# Patient Record
Sex: MALE | Race: WHITE | NOT HISPANIC OR LATINO | Employment: STUDENT | URBAN - METROPOLITAN AREA
[De-identification: names, ages, dates, MRNs, and addresses within clinical notes are randomized per-mention and may not be internally consistent; named-entity substitution may affect disease eponyms.]

---
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
## 2023-06-15 ENCOUNTER — TELEPHONE (OUTPATIENT)
Dept: INTERNAL MEDICINE | Facility: CLINIC | Age: 18
End: 2023-06-15

## 2023-06-15 NOTE — TELEPHONE ENCOUNTER
Caller: Nury Rowe    Relationship to patient: Mother    Best call back number: [phone]    Chief complaint: ESTABLISHING CARE, GENERAL CHECK-UP    Type of visit: NEW PATIENT WITH  DR. OLSEN    Requested date: ASAP    If rescheduling, when is the original appointment: N/A - THE HUB EXPLAINED THAT DR. OLSEN IS NOT CURRENTLY ACCEPTING NEW PATIENTS. HOWEVER, THE PATIENT'S MOTHER SEES DR. OLSEN AND WOULD LIKE FOR HER SON TO AS WELL. THE PATIENT NEEDS TO BE SEEN THIS SUMMER BEFORE HE [place]ENDS [place].

## 2023-07-10 PROBLEM — F90.9 ADHD (ATTENTION DEFICIT HYPERACTIVITY DISORDER): Status: ACTIVE | Noted: 2023-07-10

## 2024-07-12 ENCOUNTER — OFFICE VISIT (OUTPATIENT)
Dept: INTERNAL MEDICINE | Facility: CLINIC | Age: 19
End: 2024-07-12
Payer: COMMERCIAL

## 2024-07-12 VITALS
TEMPERATURE: 97.8 F | DIASTOLIC BLOOD PRESSURE: 70 MMHG | HEIGHT: 74 IN | SYSTOLIC BLOOD PRESSURE: 122 MMHG | WEIGHT: 315 LBS | HEART RATE: 84 BPM | OXYGEN SATURATION: 99 % | BODY MASS INDEX: 40.43 KG/M2

## 2024-07-12 DIAGNOSIS — Z00.00 PHYSICAL EXAM, ANNUAL: Primary | ICD-10-CM

## 2024-07-12 PROCEDURE — 99395 PREV VISIT EST AGE 18-39: CPT | Performed by: FAMILY MEDICINE

## 2024-07-12 NOTE — PROGRESS NOTES
Subjective   Bernard Rowe is a 19 y.o. male.   Chief Complaint   Patient presents with    Annual Exam       History of Present Illness     He is here for complete physical exam.      CPE-he has no complaints.    He just came back from study abroad in [place].  He feels great.    There is no change in medical or surgical history from last year.  No ER visits or hospitalizations.  Father was diagnosed with prostate cancer.  He is 52.  He takes no prescription medications.  No over-the-counter medications.  He is not allergic to any medications.  He does not use tobacco products, he does not drink alcohol.  No drugs.  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  He will be graduating in 2025.  He eats a diverse and healthy diet.  He likes to cook.  He eats fruits and vegetables every day.     Dental appointments every 6 months.  Last eye exam was within a year.  He wears glasses.     He reports that he is up-to-date with vaccinations.  No records available today.    Review of Systems   Constitutional:  Negative for chills and fever.   Respiratory:  Negative for shortness of breath.    Cardiovascular:  Negative for chest pain and palpitations.   Gastrointestinal:  Negative for blood in stool.   Genitourinary:  Negative for hematuria.   Neurological:  Negative for light-headedness.   Psychiatric/Behavioral: Negative.  Negative for suicidal ideas.          Objective   Wt Readings from Last 3 Encounters:   07/12/24 (!) 146 kg (322 lb 3.2 oz)   07/10/23 (!) 141 kg (310 lb)   10/10/18 111 kg (245 lb) (>99%, Z= 2.95)*     * Growth percentiles are based on CDC (Boys, 2-20 Years) data.      Vitals:    07/12/24 1425   BP: 122/70   Pulse: 84   Temp: 97.8 °F (36.6 °C)   SpO2: 99%     Temp Readings from Last 3 Encounters:   07/12/24 97.8 °F (36.6 °C)   07/10/23 98.2 °F (36.8 °C)   10/10/18 99.3 °F (37.4 °C) (Oral)     BP Readings from Last 3 Encounters:   07/12/24 122/70   07/10/23 128/80   10/10/18 (!) 132/84 (92%, Z =  1.41 /  94%, Z = 1.55)*     *BP percentiles are based on the 2017 AAP Clinical Practice Guideline for boys     Pulse Readings from Last 3 Encounters:   07/12/24 84   07/10/23 83   10/10/18 76     Body mass index is 41.35 kg/m².    Physical Exam  Vitals reviewed.   Constitutional:       General: He is not in acute distress.     Appearance: He is well-developed. He is not diaphoretic.   HENT:      Head: Normocephalic and atraumatic. Hair is normal.      Right Ear: Hearing, tympanic membrane, ear canal and external ear normal.      Left Ear: Hearing, tympanic membrane, ear canal and external ear normal.      Nose: No nasal deformity.      Mouth/Throat:      Mouth: No oral lesions.      Pharynx: Uvula midline. No uvula swelling.   Eyes:      General: Lids are normal. No scleral icterus.        Right eye: No discharge.         Left eye: No discharge.      Extraocular Movements:      Right eye: Normal extraocular motion and no nystagmus.      Left eye: Normal extraocular motion and no nystagmus.      Conjunctiva/sclera: Conjunctivae normal.      Pupils: Pupils are equal, round, and reactive to light.   Neck:      Thyroid: No thyromegaly.      Vascular: No JVD.   Cardiovascular:      Rate and Rhythm: Normal rate and regular rhythm.      Pulses: Normal pulses.      Heart sounds: Normal heart sounds. No murmur heard.     No gallop.   Pulmonary:      Effort: Pulmonary effort is normal. No respiratory distress.      Breath sounds: Normal breath sounds. No wheezing or rales.   Chest:      Chest wall: No tenderness.   Abdominal:      General: Bowel sounds are normal. There is no distension.      Palpations: Abdomen is soft. There is no mass.      Tenderness: There is no abdominal tenderness. There is no guarding.      Hernia: No hernia is present. There is no hernia in the left inguinal area or right inguinal area.   Genitourinary:     Testes: Normal.         Right: Mass, tenderness or swelling not present.         Left: Mass,  tenderness or swelling not present.   Musculoskeletal:         General: No tenderness or deformity. Normal range of motion.      Cervical back: Normal range of motion.   Lymphadenopathy:      Cervical: No cervical adenopathy.      Upper Body:      Right upper body: No supraclavicular adenopathy.      Left upper body: No supraclavicular adenopathy.      Lower Body: No right inguinal adenopathy. No left inguinal adenopathy.   Skin:     General: Skin is warm and dry.      Findings: No rash.   Neurological:      Mental Status: He is alert and oriented to person, place, and time.      Cranial Nerves: No cranial nerve deficit.      Motor: No abnormal muscle tone.      Coordination: Coordination normal.      Deep Tendon Reflexes: Reflexes are normal and symmetric. Reflexes normal.   Psychiatric:         Behavior: Behavior normal.         Thought Content: Thought content normal.         Judgment: Judgment normal.         Assessment & Plan   Diagnoses and all orders for this visit:    1. Physical exam, annual (Primary)        CPE-preventive counseling provided.  Obesity increases risk for developing diabetes and heart disease.  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  He would benefit from weight loss.  He reports that he is up-to-date with vaccinations.  He will bring vaccination list to our office or mail it to us.  Sun protection discussed and recommended.  Continue regular dental appointments every 6 months.          Class 3 Severe Obesity (BMI >=40). Obesity-related health conditions include the following: none. Obesity is unchanged. BMI is is above average; BMI management plan is completed. We discussed portion control and increasing exercise.

## 2025-06-30 ENCOUNTER — TELEPHONE (OUTPATIENT)
Dept: INTERNAL MEDICINE | Facility: CLINIC | Age: 20
End: 2025-06-30

## 2025-06-30 ENCOUNTER — TELEPHONE (OUTPATIENT)
Dept: INTERNAL MEDICINE | Facility: CLINIC | Age: 20
End: 2025-06-30
Payer: COMMERCIAL

## 2025-06-30 NOTE — TELEPHONE ENCOUNTER
Caller: Bernard Rowe    Relationship to patient: Self    Best call back number:[phone]     Chief complaint: PHYSICAL    Type of visit: PHYSICAL    Requested date: SOME TIME IN JULY    If rescheduling, when is the original appointment: NOTHING UNTIL DECEMBER THAT THE HUB CAN SEE    Additional notes:PATIENT WOULD LIKE TO SEE

## 2025-07-17 ENCOUNTER — OFFICE VISIT (OUTPATIENT)
Dept: INTERNAL MEDICINE | Facility: CLINIC | Age: 20
End: 2025-07-17
Payer: COMMERCIAL

## 2025-07-17 VITALS
RESPIRATION RATE: 17 BRPM | HEIGHT: 74 IN | OXYGEN SATURATION: 97 % | SYSTOLIC BLOOD PRESSURE: 120 MMHG | DIASTOLIC BLOOD PRESSURE: 78 MMHG | BODY MASS INDEX: 40.43 KG/M2 | TEMPERATURE: 97.2 F | WEIGHT: 315 LBS | HEART RATE: 85 BPM

## 2025-07-17 DIAGNOSIS — Z00.00 PHYSICAL EXAM, ANNUAL: Primary | ICD-10-CM

## 2025-07-17 PROCEDURE — 99395 PREV VISIT EST AGE 18-39: CPT | Performed by: FAMILY MEDICINE

## 2025-07-17 NOTE — PROGRESS NOTES
Subjective   Bernard Rowe is a 20 y.o. male.   Chief Complaint   Patient presents with    Annual Exam       History of Present Illness     He is here for complete physical exam.       CPE-he has no complaints.     He just graduated from [employer].  He will be moving to [place] to study for [employer] and play football.  [employer] -10 months program.     There is no change in medical or surgical history from last year.    No ER visits or hospitalizations.  He had minor sprains playing football, but no serious injuries, no concussions.  Sister was diagnosed with seizures.    He takes no prescription medications.  No over-the-counter medications.  He is not allergic to any medications.  He does not use tobacco products, he drinks few drinks a year.  No drugs.  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  He eats a diverse and healthy diet.  He follows with nutritionist daily.     Dental appointments every 6 months.  Last eye exam was within a year.  He wears glasses.  Stable vision.     He reports that he is up-to-date with vaccinations.  No records available today.    Review of Systems   Constitutional:  Negative for fever.   Respiratory:  Negative for shortness of breath and wheezing.    Cardiovascular:  Negative for chest pain and palpitations.   Gastrointestinal:  Negative for blood in stool.   Genitourinary:  Negative for hematuria.   Neurological:  Negative for light-headedness.   Psychiatric/Behavioral:  Negative for suicidal ideas.          Objective   Wt Readings from Last 3 Encounters:   07/17/25 (!) 145 kg (320 lb)   06/21/25 (!) 146 kg (322 lb)   07/12/24 (!) 146 kg (322 lb 3.2 oz)      Vitals:    07/17/25 1416   BP: 120/78   Pulse: 85   Resp: 17   Temp: 97.2 °F (36.2 °C)   SpO2: 97%     Temp Readings from Last 3 Encounters:   07/17/25 97.2 °F (36.2 °C) (Oral)   06/21/25 98.3 °F (36.8 °C) (Oral)   07/12/24 97.8 °F (36.6 °C)     BP Readings from Last 3 Encounters:   07/17/25 120/78    06/21/25 157/99   07/12/24 122/70     Pulse Readings from Last 3 Encounters:   07/17/25 85   06/21/25 88   07/12/24 84     Body mass index is 41.07 kg/m².    Physical Exam  Vitals reviewed.   Constitutional:       General: He is not in acute distress.     Appearance: He is well-developed. He is not diaphoretic.   HENT:      Head: Normocephalic and atraumatic. Hair is normal.      Right Ear: Hearing, tympanic membrane, ear canal and external ear normal.      Left Ear: Hearing, tympanic membrane, ear canal and external ear normal.      Nose: No nasal deformity.      Mouth/Throat:      Mouth: No oral lesions.      Pharynx: Uvula midline. No uvula swelling.   Eyes:      General: Lids are normal. No scleral icterus.        Right eye: No discharge.         Left eye: No discharge.      Extraocular Movements:      Right eye: Normal extraocular motion and no nystagmus.      Left eye: Normal extraocular motion and no nystagmus.      Conjunctiva/sclera: Conjunctivae normal.      Pupils: Pupils are equal, round, and reactive to light.   Neck:      Thyroid: No thyromegaly.      Vascular: No JVD.   Cardiovascular:      Rate and Rhythm: Normal rate and regular rhythm.      Pulses: Normal pulses.      Heart sounds: Normal heart sounds. No murmur heard.     No gallop.   Pulmonary:      Effort: Pulmonary effort is normal. No respiratory distress.      Breath sounds: Normal breath sounds. No wheezing or rales.   Chest:      Chest wall: No tenderness.   Abdominal:      General: There is no distension.      Palpations: Abdomen is soft. There is no mass.      Tenderness: There is no abdominal tenderness. There is no guarding.      Hernia: No hernia is present. There is no hernia in the left inguinal area or right inguinal area.   Genitourinary:     Testes: Normal.         Right: Mass or tenderness not present.         Left: Mass or tenderness not present.   Musculoskeletal:         General: No tenderness or deformity. Normal range of  motion.      Cervical back: Normal range of motion.   Lymphadenopathy:      Cervical: No cervical adenopathy.      Upper Body:      Right upper body: No supraclavicular adenopathy.      Left upper body: No supraclavicular adenopathy.      Lower Body: No right inguinal adenopathy. No left inguinal adenopathy.   Skin:     General: Skin is warm and dry.      Findings: No rash.   Neurological:      Mental Status: He is alert and oriented to person, place, and time.      Cranial Nerves: No cranial nerve deficit.      Motor: No abnormal muscle tone.      Coordination: Coordination normal.      Deep Tendon Reflexes: Reflexes are normal and symmetric. Reflexes normal.   Psychiatric:         Behavior: Behavior normal.         Thought Content: Thought content normal.         Judgment: Judgment normal.       Assessment & Plan   Diagnoses and all orders for this visit:    1. Physical exam, annual (Primary)        CPE-preventive counseling provided.  He works closely with nutritionist.  Safety discussed and handouts added to discharge summary.  Continue regular dental appointments at least every 6 months.  Yearly eye exams.  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  He thinks that he is up-to-date with vaccinations.  I asked for immunization records.  Sun protection recommended.